# Patient Record
Sex: MALE | Race: OTHER | HISPANIC OR LATINO | URBAN - METROPOLITAN AREA
[De-identification: names, ages, dates, MRNs, and addresses within clinical notes are randomized per-mention and may not be internally consistent; named-entity substitution may affect disease eponyms.]

---

## 2024-04-02 ENCOUNTER — EMERGENCY (EMERGENCY)
Facility: HOSPITAL | Age: 34
LOS: 1 days | Discharge: HOME | End: 2024-04-02
Attending: EMERGENCY MEDICINE
Payer: COMMERCIAL

## 2024-04-02 VITALS
TEMPERATURE: 98 F | HEIGHT: 65 IN | RESPIRATION RATE: 16 BRPM | SYSTOLIC BLOOD PRESSURE: 125 MMHG | DIASTOLIC BLOOD PRESSURE: 79 MMHG | WEIGHT: 179.9 LBS | OXYGEN SATURATION: 99 % | HEART RATE: 75 BPM

## 2024-04-02 VITALS
TEMPERATURE: 98 F | HEART RATE: 70 BPM | RESPIRATION RATE: 18 BRPM | SYSTOLIC BLOOD PRESSURE: 116 MMHG | OXYGEN SATURATION: 99 % | DIASTOLIC BLOOD PRESSURE: 80 MMHG

## 2024-04-02 LAB
HCT VFR BLD CALC: 44.8 % — SIGNIFICANT CHANGE UP (ref 39–50)
HGB BLD-MCNC: 15.5 G/DL — SIGNIFICANT CHANGE UP (ref 13–17)
MCHC RBC-ENTMCNC: 29.9 PG — SIGNIFICANT CHANGE UP (ref 27–34)
MCHC RBC-ENTMCNC: 34.6 GM/DL — SIGNIFICANT CHANGE UP (ref 32–36)
MCV RBC AUTO: 86.3 FL — SIGNIFICANT CHANGE UP (ref 80–100)
PLATELET # BLD AUTO: 201 K/UL — SIGNIFICANT CHANGE UP (ref 150–400)
RBC # BLD: 5.19 M/UL — SIGNIFICANT CHANGE UP (ref 4.2–5.8)
RBC # FLD: 13.1 % — SIGNIFICANT CHANGE UP (ref 10.3–14.5)
WBC # BLD: 7.89 K/UL — SIGNIFICANT CHANGE UP (ref 3.8–10.5)
WBC # FLD AUTO: 7.89 K/UL — SIGNIFICANT CHANGE UP (ref 3.8–10.5)

## 2024-04-02 PROCEDURE — 73610 X-RAY EXAM OF ANKLE: CPT

## 2024-04-02 PROCEDURE — T1013: CPT

## 2024-04-02 PROCEDURE — 73630 X-RAY EXAM OF FOOT: CPT | Mod: 26,50

## 2024-04-02 PROCEDURE — 99285 EMERGENCY DEPT VISIT HI MDM: CPT

## 2024-04-02 PROCEDURE — 96374 THER/PROPH/DIAG INJ IV PUSH: CPT | Mod: XU

## 2024-04-02 PROCEDURE — 36415 COLL VENOUS BLD VENIPUNCTURE: CPT

## 2024-04-02 PROCEDURE — 96375 TX/PRO/DX INJ NEW DRUG ADDON: CPT | Mod: XU

## 2024-04-02 PROCEDURE — 73700 CT LOWER EXTREMITY W/O DYE: CPT | Mod: 26,LT,MC

## 2024-04-02 PROCEDURE — 85027 COMPLETE CBC AUTOMATED: CPT

## 2024-04-02 PROCEDURE — 73610 X-RAY EXAM OF ANKLE: CPT | Mod: 26,50

## 2024-04-02 PROCEDURE — 73630 X-RAY EXAM OF FOOT: CPT

## 2024-04-02 PROCEDURE — 99284 EMERGENCY DEPT VISIT MOD MDM: CPT | Mod: 25

## 2024-04-02 PROCEDURE — 27780 TREATMENT OF FIBULA FRACTURE: CPT

## 2024-04-02 PROCEDURE — 73700 CT LOWER EXTREMITY W/O DYE: CPT | Mod: MC

## 2024-04-02 PROCEDURE — 73590 X-RAY EXAM OF LOWER LEG: CPT | Mod: 26,50

## 2024-04-02 PROCEDURE — 99283 EMERGENCY DEPT VISIT LOW MDM: CPT

## 2024-04-02 PROCEDURE — 73590 X-RAY EXAM OF LOWER LEG: CPT

## 2024-04-02 RX ORDER — OXYCODONE HYDROCHLORIDE 5 MG/1
1 TABLET ORAL
Qty: 15 | Refills: 0
Start: 2024-04-02 | End: 2024-04-06

## 2024-04-02 RX ORDER — KETOROLAC TROMETHAMINE 30 MG/ML
30 SYRINGE (ML) INJECTION ONCE
Refills: 0 | Status: DISCONTINUED | OUTPATIENT
Start: 2024-04-02 | End: 2024-04-02

## 2024-04-02 RX ORDER — ACETAMINOPHEN 500 MG
3 TABLET ORAL
Qty: 63 | Refills: 0
Start: 2024-04-02 | End: 2024-04-08

## 2024-04-02 RX ORDER — MORPHINE SULFATE 50 MG/1
4 CAPSULE, EXTENDED RELEASE ORAL ONCE
Refills: 0 | Status: DISCONTINUED | OUTPATIENT
Start: 2024-04-02 | End: 2024-04-02

## 2024-04-02 RX ORDER — ACETAMINOPHEN 500 MG
1 TABLET ORAL
Qty: 21 | Refills: 0
Start: 2024-04-02 | End: 2024-04-08

## 2024-04-02 RX ADMIN — Medication 30 MILLIGRAM(S): at 13:24

## 2024-04-02 RX ADMIN — MORPHINE SULFATE 4 MILLIGRAM(S): 50 CAPSULE, EXTENDED RELEASE ORAL at 11:11

## 2024-04-02 NOTE — ED PROVIDER NOTE - ATTENDING APP SHARED VISIT CONTRIBUTION OF CARE
indep eval  s/p 600 pound steel girder type piece of steel weighing approx 600 lb slammed into L and R le  pain max L ankle  pe + distress + distal fib /ankle swelling and ttp dorsalis pedis 2 + symm  agree w iv pain meds iv imaging sig comminuted distal 1/3 fib fx comminutes  ortho consult in progress - agree w consult

## 2024-04-02 NOTE — ED PROVIDER NOTE - OBJECTIVE STATEMENT
35 yo male presented to [facility] ED by ambulance after having a 600lbs metal anita falls on his left side LE near the ankle. Patient reports significant 10/10 pain, has not tried ambulating since the incident. He hasn't received any pain meds prior to ED visit. He denies any history of surgeries or accidents before.     In ED, patient received 4mg IVP morphine.  Patient denies any medical history at this time.    OBJECTIVE:  Physical Exam:  Gen: uncomfortable lying in bed  Abd: Soft  Ext: Exquisitely tender at left ankle. Lateral left ankle and medial right ankle have bruise marks and skin changes.  Pedal pulses intact bilateral. No popliteal tenderness or tenderness at the knees.

## 2024-04-02 NOTE — ED PROVIDER NOTE - PATIENT PORTAL LINK FT
You can access the FollowMyHealth Patient Portal offered by [facility] by registering at the following website: [email or web address]. By joining [facility]’s FollowMyHealth portal, you will also be able to view your health information using other applications (apps) compatible with our system.

## 2024-04-02 NOTE — ED PROVIDER NOTE - CLINICAL SUMMARY MEDICAL DECISION MAKING FREE TEXT BOX
35 yo male presented to [facility] ED by ambulance after having a 600lbs metal anita falls on his left side LE near the ankle, XR revealed evidence of distal left fibular fracture. Ortho consulted and recommended splint on LLE. CT performed per ortho recommendations with no further intervention. Patient to f/u within 1 week with Dr. Villalta.

## 2024-04-02 NOTE — PHYSICAL THERAPY INITIAL EVALUATION ADULT - ADDITIONAL COMMENTS
Pt lives with family in a1 story house with 2 steps to enter. Independent with all PTA, without devices.

## 2024-04-02 NOTE — CONSULT NOTE ADULT - SUBJECTIVE AND OBJECTIVE BOX
Pt Name: EDISON QUINCHOREYES  MRN: [id number]    Patient is a 34y Male presenting to the emergency department with a chief complaint left lower extremity pain after a large piece of metal landed on both his leg. 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 Patient admits to swelling and pain with movement of the ankle. Denies numbness or tingling. Patient also has right lower extremity pain but states the left side is significantly worse. Patient is Telugu speaking - ED  was used for the visit.     REVIEW OF SYSTEMS  General: Alert, responsive, in NAD  Musculoskeletal: SEE HPI.  Neurological: No sensory or motor changes.     PAST MEDICAL & SURGICAL HISTORY:  Allergies: No Known Allergies    Medications: ketorolac   Injectable 30 milliGRAM(s) IV Push Once    FAMILY HISTORY: non-contributory    Ambulation: Walking independently                          15.5   7.89  )-----------( 201      ( 02 Apr 2024 10:30 )             44.8     Vital Signs Last 24 Hrs  T(C): 36.6 (02 Apr 2024 10:00), Max: 36.6 (02 Apr 2024 10:00)  T(F): 97.8 (02 Apr 2024 10:00), Max: 97.8 (02 Apr 2024 10:00)  HR: 75 (02 Apr 2024 10:00) (75 - 75)  BP: 125/79 (02 Apr 2024 10:00) (125/79 - 125/79)  BP(mean): --  RR: 16 (02 Apr 2024 10:00) (16 - 16)  SpO2: 99% (02 Apr 2024 10:00) (99% - 99%)  Parameters below as of 02 Apr 2024 10:00  Patient On (Oxygen Delivery Method): room air  Daily Height in cm: 165.1 (02 Apr 2024 10:00)      PHYSICAL EXAM:  Appearance: Alert, responsive, in no acute distress.  Neurological: Sensation is grossly intact to light touch to the bilateral lower extremities  Vascular: DP pulse of the LLE intact. Cap refill < 2 sec. No cyanosis.  Musculoskeletal:  Left Lower Extremity: TTP over the lateral and medial mal with swelling around the ankle and lower shin. +EHL/FHL. Dorsiflexion and plantarflexion intact but limited secondary to pain. Compartments soft and compressible.     Imaging Studies: Official reads pending - LLE films show fracture of the distal 3rd fibula. RLE films reveal no obvious fractures or bony deformities.     SPLINTING   PROCEDURE NOTE: Splinting  Performed by: Amy Peguero PA-C   Indication: Left fibula fracture   The left lower extremity  was appropriately positioned. A plaster splint was applied. Distally, the extremity was neurovascular intact following the procedure. The patient tolerated the procedure well.    A/P:  Pt is a  34y Male with found to have a left distal fibula fracture   PLAN:   * CT ordered - follow up results   * Patient educated on splint care - knows to not get the splint wet and knows to not bare weight on the splint   * PT should be consulted for crutch training   * NWB on the LLE  * Patient educated on elevation   * Follow up outpatient with Dr. Villalta within 1 week     Case discussed with Dr. Villalta who reviewed imaging and gave plan  Pt Name: EDISON QUINCHOREYES  MRN: [id number]    Patient is a 34y Male presenting to the emergency department with a chief complaint left lower extremity pain after a large piece of metal landed on both his leg. 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 Patient admits to swelling and pain with movement of the ankle. Denies numbness or tingling. Patient also has right lower extremity pain but states the left side is significantly worse. Patient is Lithuanian speaking - ED  was used for the visit.     REVIEW OF SYSTEMS  General: Alert, responsive, in NAD  Musculoskeletal: SEE HPI.  Neurological: No sensory or motor changes.     PAST MEDICAL & SURGICAL HISTORY:  Allergies: No Known Allergies    Medications: ketorolac   Injectable 30 milliGRAM(s) IV Push Once    FAMILY HISTORY: non-contributory    Ambulation: Walking independently                          15.5   7.89  )-----------( 201      ( 02 Apr 2024 10:30 )             44.8     Vital Signs Last 24 Hrs  T(C): 36.6 (02 Apr 2024 10:00), Max: 36.6 (02 Apr 2024 10:00)  T(F): 97.8 (02 Apr 2024 10:00), Max: 97.8 (02 Apr 2024 10:00)  HR: 75 (02 Apr 2024 10:00) (75 - 75)  BP: 125/79 (02 Apr 2024 10:00) (125/79 - 125/79)  BP(mean): --  RR: 16 (02 Apr 2024 10:00) (16 - 16)  SpO2: 99% (02 Apr 2024 10:00) (99% - 99%)  Parameters below as of 02 Apr 2024 10:00  Patient On (Oxygen Delivery Method): room air  Daily Height in cm: 165.1 (02 Apr 2024 10:00)      PHYSICAL EXAM:  Appearance: Alert, responsive, in no acute distress.  Neurological: Sensation is grossly intact to light touch to the bilateral lower extremities  Vascular: DP pulse of the LLE intact. Cap refill < 2 sec. No cyanosis.  Musculoskeletal:  Left Lower Extremity: TTP over the lateral and medial mal with swelling around the ankle and lower shin. +EHL/FHL. Dorsiflexion and plantarflexion intact but limited secondary to pain. Compartments soft and compressible.     Imaging Studies: Official reads pending - LLE films show fracture of the distal 3rd fibula. RLE films reveal no obvious fractures or bony deformities.     SPLINTING   PROCEDURE NOTE: Splinting  Performed by: Amy Peguero PA-C   Indication: Left fibula fracture   The left lower extremity  was appropriately positioned. A plaster splint was applied. Distally, the extremity was neurovascular intact following the procedure. The patient tolerated the procedure well.    A/P:  Pt is a  34y Male with found to have a left distal fibula fracture s/p closed treatment without manipulation with splint immobilization  PLAN:   * CT ordered - follow up results   * Patient educated on splint care - knows to not get the splint wet and knows to not bare weight on the splint   * PT should be consulted for crutch training   * NWB on the LLE  * Patient educated on elevation   * Follow up outpatient with Dr. Villalta within 1 week     Case discussed with Dr. Villalta who reviewed imaging and gave plan

## 2024-04-02 NOTE — CONSULT NOTE ADULT - NS ATTEND AMEND GEN_ALL_CORE FT
Agree with PA note and plan as written.  Continue splint immobilization and closed treatment of fibula shaft and distal fibula fracture.  Continue ice and elevation and follow up as outpatient for definitive care - ortho to follow closely, please call with any issues.

## 2024-04-02 NOTE — ED ADULT NURSE NOTE - OBJECTIVE STATEMENT
PAtient presents to ER C/O klaus ankle pain, reports work injury this AM, states metal columns fell onto his ankles, swelling noted bilaterally, reports pain 10/10, no PMH, resp even/unlabored.

## 2024-04-02 NOTE — DISCHARGE NOTE PROVIDER - NSDCFUADDINST_GEN_ALL_CORE_FT
Ortho Recs: Patient is NWB of the left lower extremity - patient to use crutches for ambulation. Patient should follow up with Dr. Villalta within 1 week. Please keep splint clean and dry. Do not get splint wet. The patient is recommended to elevated the affected extremity to reduce swelling. If the splint becomes too tight, the patient is to immediately elevate and contact the office to discuss further management or immediately proceed to the office if unable to make contact with the office.

## 2024-04-02 NOTE — ED ADULT TRIAGE NOTE - CHIEF COMPLAINT QUOTE
Pt LISETH from work after 2 iron columns that weight approx 600lbs a piece fell over onto his shins and ankles.

## 2024-04-02 NOTE — ED PROVIDER NOTE - CARE PROVIDER_API CALL
Martin Villalta  Orthopaedic Surgery  [address] [place] [address]  Phone: [phone]  Fax: [phone]  Follow Up Time: Urgent

## 2024-04-02 NOTE — DISCHARGE NOTE PROVIDER - CARE PROVIDER_API CALL
Martin Villalta  Orthopaedic Surgery  [address] [place] [address]  Phone: [phone]  Fax: [phone]  Follow Up Time:

## 2024-04-02 NOTE — ED PROVIDER NOTE - NSFOLLOWUPINSTRUCTIONS_ED_ALL_ED_FT
Oxycodone 5mg (1 pill) every 8 hours as needed for severe pain  Tylenol 975mg (3 tablets) every 8 hours  Use crutches to ambulate with no weight bearing on left leg  Call Dr. Villalta/[facility] office for appointment within 1 week    Oxicodona 5 mg (1 pastilla) cada 8 horas según sea necesario para el dolor intenso  Tylenol 975 mg (3 comprimidos) cada 8 horas  Utilice muletas para deambular sin apoyar peso en la benyna greg.  Llame a la oficina del Dr. Villalta/[facility] para programar rg kajal dentro de 1 semana

## 2024-04-15 ENCOUNTER — OUTPATIENT (OUTPATIENT)
Dept: OUTPATIENT SERVICES | Facility: HOSPITAL | Age: 34
LOS: 1 days | End: 2024-04-15
Payer: COMMERCIAL

## 2024-04-15 VITALS
SYSTOLIC BLOOD PRESSURE: 100 MMHG | HEART RATE: 93 BPM | RESPIRATION RATE: 18 BRPM | DIASTOLIC BLOOD PRESSURE: 60 MMHG | WEIGHT: 180.34 LBS | HEIGHT: 68 IN | OXYGEN SATURATION: 98 % | TEMPERATURE: 98 F

## 2024-04-15 DIAGNOSIS — Z01.818 ENCOUNTER FOR OTHER PREPROCEDURAL EXAMINATION: ICD-10-CM

## 2024-04-15 DIAGNOSIS — S82.892A OTHER FRACTURE OF LEFT LOWER LEG, INITIAL ENCOUNTER FOR CLOSED FRACTURE: ICD-10-CM

## 2024-04-15 DIAGNOSIS — S82.409A UNSPECIFIED FRACTURE OF SHAFT OF UNSPECIFIED FIBULA, INITIAL ENCOUNTER FOR CLOSED FRACTURE: ICD-10-CM

## 2024-04-15 DIAGNOSIS — Z29.9 ENCOUNTER FOR PROPHYLACTIC MEASURES, UNSPECIFIED: ICD-10-CM

## 2024-04-15 LAB
A1C WITH ESTIMATED AVERAGE GLUCOSE RESULT: 5 % — SIGNIFICANT CHANGE UP (ref 4–5.6)
ANION GAP SERPL CALC-SCNC: 12 MMOL/L — SIGNIFICANT CHANGE UP (ref 5–17)
APTT BLD: 32.4 SEC — SIGNIFICANT CHANGE UP (ref 24.5–35.6)
BASOPHILS # BLD AUTO: 0.03 K/UL — SIGNIFICANT CHANGE UP (ref 0–0.2)
BASOPHILS NFR BLD AUTO: 0.4 % — SIGNIFICANT CHANGE UP (ref 0–2)
BLD GP AB SCN SERPL QL: SIGNIFICANT CHANGE UP
BUN SERPL-MCNC: 17 MG/DL — SIGNIFICANT CHANGE UP (ref 8–20)
CALCIUM SERPL-MCNC: 9.2 MG/DL — SIGNIFICANT CHANGE UP (ref 8.4–10.5)
CHLORIDE SERPL-SCNC: 103 MMOL/L — SIGNIFICANT CHANGE UP (ref 96–108)
CO2 SERPL-SCNC: 26 MMOL/L — SIGNIFICANT CHANGE UP (ref 22–29)
CREAT SERPL-MCNC: 0.88 MG/DL — SIGNIFICANT CHANGE UP (ref 0.5–1.3)
EGFR: 117 ML/MIN/1.73M2 — SIGNIFICANT CHANGE UP
EOSINOPHIL # BLD AUTO: 0.11 K/UL — SIGNIFICANT CHANGE UP (ref 0–0.5)
EOSINOPHIL NFR BLD AUTO: 1.3 % — SIGNIFICANT CHANGE UP (ref 0–6)
ESTIMATED AVERAGE GLUCOSE: 97 MG/DL — SIGNIFICANT CHANGE UP (ref 68–114)
GLUCOSE SERPL-MCNC: 97 MG/DL — SIGNIFICANT CHANGE UP (ref 70–99)
HCT VFR BLD CALC: 39.9 % — SIGNIFICANT CHANGE UP (ref 39–50)
HGB BLD-MCNC: 13.2 G/DL — SIGNIFICANT CHANGE UP (ref 13–17)
IMM GRANULOCYTES NFR BLD AUTO: 0.5 % — SIGNIFICANT CHANGE UP (ref 0–0.9)
INR BLD: 1.01 RATIO — SIGNIFICANT CHANGE UP (ref 0.85–1.18)
LYMPHOCYTES # BLD AUTO: 2.25 K/UL — SIGNIFICANT CHANGE UP (ref 1–3.3)
LYMPHOCYTES # BLD AUTO: 27 % — SIGNIFICANT CHANGE UP (ref 13–44)
MCHC RBC-ENTMCNC: 29.1 PG — SIGNIFICANT CHANGE UP (ref 27–34)
MCHC RBC-ENTMCNC: 33.1 GM/DL — SIGNIFICANT CHANGE UP (ref 32–36)
MCV RBC AUTO: 88.1 FL — SIGNIFICANT CHANGE UP (ref 80–100)
MONOCYTES # BLD AUTO: 0.46 K/UL — SIGNIFICANT CHANGE UP (ref 0–0.9)
MONOCYTES NFR BLD AUTO: 5.5 % — SIGNIFICANT CHANGE UP (ref 2–14)
NEUTROPHILS # BLD AUTO: 5.44 K/UL — SIGNIFICANT CHANGE UP (ref 1.8–7.4)
NEUTROPHILS NFR BLD AUTO: 65.3 % — SIGNIFICANT CHANGE UP (ref 43–77)
PLATELET # BLD AUTO: 309 K/UL — SIGNIFICANT CHANGE UP (ref 150–400)
POTASSIUM SERPL-MCNC: 4.5 MMOL/L — SIGNIFICANT CHANGE UP (ref 3.5–5.3)
POTASSIUM SERPL-SCNC: 4.5 MMOL/L — SIGNIFICANT CHANGE UP (ref 3.5–5.3)
PROTHROM AB SERPL-ACNC: 11.2 SEC — SIGNIFICANT CHANGE UP (ref 9.5–13)
RBC # BLD: 4.53 M/UL — SIGNIFICANT CHANGE UP (ref 4.2–5.8)
RBC # FLD: 13.2 % — SIGNIFICANT CHANGE UP (ref 10.3–14.5)
SODIUM SERPL-SCNC: 141 MMOL/L — SIGNIFICANT CHANGE UP (ref 135–145)
WBC # BLD: 8.33 K/UL — SIGNIFICANT CHANGE UP (ref 3.8–10.5)
WBC # FLD AUTO: 8.33 K/UL — SIGNIFICANT CHANGE UP (ref 3.8–10.5)

## 2024-04-15 PROCEDURE — G0463: CPT

## 2024-04-15 NOTE — H&P PST ADULT - LAST ECHOCARDIOGRAM
[facility] Progress Note


Progress Note


DATE OF SERVICE: 3/9/17





HISTORY: Patient is 34-year-old male with history of ADHD, depression and 

benzodiazepine abuse, notes he had been prescribed Klonopin X 1 month, denies 

abusing medications which is contrary to ER intake report. Writer met with 

patient today to assess treatment progress on the inpatient unit. Patient 

states he has been making more effort to be visible on unit and attend groups, 

though reiterates today, "I don't like groups and closed up spaces, it stems 

from me being in [facility]." Patient reports reduced anxiety and depression, denies 

suicidal and homicidal ideation, denies audiovisual hallucinations, denies urge 

to engage in self-injurious behavior. Patient is on Librium taper and denies 

symptoms of craving or withdrawal. Patient engages better today for interaction

, is less superficially bright, less superficially compliant, expresses 

somewhat less minimization of symptoms and history of substance abuse.  Patient 

remains in agreement with benzodiazepine taper, adds medication regimen remains 

effective and denies medication side effects. Patient denies challenges with 

sleep, concentration, or energy level, states appetite is improving. Patient 

remains fixated on discharge, indicates today he is considering participating 

in substance abuse treatment, remains interested in resuming treatment with 

previous outpatient provider upon discharge from hospital. Patient exhibits no 

signs of agitation or anger, denies symptoms physical pain, and presents with 

no indication of acute distress at time of interaction.





VITAL SIGNS: See below.





NEW TEST RESULTS: No new results


MEDICAL HISTORY: Cleft palate repair, pneumothorax/chest tube


LABS AT ADMISSION:  His CBC showed a hemoglobin of 13.7, hematocrit 40.  Rest


within normal limits.  CMP is unremarkable except AST of 59.  Urine drug screen


is positive for benzodiazepine, cocaine and cannabis.  Blood alcohol level of


0.013.


EKG 3/6/17 sinus rhythm T-wave abnormality early repolarization pericarditis 

other no prior





CURRENT MEDICATIONS: See below.





MENTAL STATUS EXAMINATION ON DISCHARGE: 


Patient is a 34 -year-old male who is pleasant and somewhat less superficially 

cooperative, appears somewhat less disheveled, makes fair eye contact, 

ambulates with steady gait, appears stated age 


Speech is less pressured, generally normal rhythm and volume, coherent.


Language skills are intact.


Thought processes including: Clear, goal-directed, fixated on discharge


Thought content: Rational, logical.


Abstract reasoning, and computation: Requires further evaluation.


Description of associations: Appear intact.


Description of abnormal or psychotic thoughts: denies hallucinations, delusions

, preoccupation with violence, homicidal or suicidal ideation, and obsessions].


Judgment: Poor, verbalizes some improvement.


Insight: Poor, expresses some improvement


Orientation to time, place and person.


Recent and remote memory: Immediate, short-term and long-term memory appear 

intact.


Attention span and concentration: Limited, some improvement.


Language: Normal.


Fund of knowledge: Adequate.


Mood: "I feel fine." Patient appears less depressed and anxious, no mood 

lability noted, is superficially compliant, demise his symptoms


Affect: Constricted, generally congruent with mood 





DIAGNOSES: Unspecified depressive disorder, Attention deficit hyperactivity 

disorder by history, polysubstance use disorder, rule out substance-induced 

mood disorder 


 


ASSESSMENT: Patient continues to adjust to unit, is observed to be somewhat 

more visible on unit, isolates to room less, has been making increased effort 

to attend groups. Patient denies suicidal and homicidal thinking and is able to 

verbalize how to access supportive services on the unit if needed. Patient is 

less superficially compliant and is minimizing less his substance use/abuse, 

seems to be beginning to understand the gravity of events which led to his 

current hospitalization. Will continue to monitor patient's response to 

medications and benzo taper, monitor for side effects, and evaluate resolution 

of suicidal thinking and agitation, and discharge readiness. Patient indicates 

when ready he plans to discharge to home with girlfriend and participate in 

outpatient psychiatric services, has been encouraged to consider inpatient 

substance abuse treatment, patient is declining at this time but agrees to 

consider.





MANAGEMENT PLAN: Continue Librium taper: Librium to 5 mg po BID to end tonight 

after 2100 dose, thereafter 5 mg po hs X 1 on Friday then stop. Continue 

mirtazapine 45 mg po q hs, gabapentin 800 mg po TID, and Prozac 40 mg po q am 


Maintain safety precautions


Patient to attend groups and participate in unit programming to develop coping 

strategies


Engage patient in discharge planning process and arrange meeting with patient's 

support system to ensure safe discharge planning when appropriate


Patient to follow up with PCM upon discharge





TIME SPENT: 25  minutes.





Vital Signs





 Vital Signs








  Date Time  Temp Pulse Resp B/P Pulse Ox O2 Delivery O2 Flow Rate FiO2


 


3/9/17 06:32 95.4 66 16 106/69    


 


3/7/17 09:00      Room Air  


 


3/5/17 18:15     99   











Current Medications





 Current Medications


Acetaminophen (Tylenol Tab) 650 mg Q6HP  PRN PO HEADACHE or DISCOMFORT;  Start 3

/5/17 at 18:00;  Stop 4/4/17 at 17:59


Al Hydrox/Mg Hydrox/Simethicone (Mylanta) 30 ml Q4HP  PRN PO HEARTBURN/

INDIGESTION;  Start 3/5/17 at 18:00;  Stop 4/4/17 at 17:59


Chlordiazepoxide (Librium) 5 mg BID PO  Last administered on 3/9/17at 08:58;  

Start 3/8/17 at 09:00;  Stop 3/9/17 at 14:34;  Status DC


Chlordiazepoxide (Librium) 5 mg BID PO ;  Start 3/9/17 at 22:00;  Stop 3/16/17 

at 21:59;  Status UNV


Chlordiazepoxide (Librium) 5 mg QHS PO ;  Start 3/10/17 at 21:00;  Stop 3/10/17 

at 22:00;  Status UNV


Chlordiazepoxide (Librium) 10 mg BID PO  Last administered on 3/7/17at 08:45;  

Start 3/6/17 at 09:00;  Stop 3/7/17 at 18:57;  Status DC


Chlordiazepoxide (Librium) 10 mg QHS PO ;  Start 3/8/17 at 21:00;  Stop 3/8/17 

at 21:00;  Status DC


Chlordiazepoxide (Librium) 25 mg TIDP  PRN PO SEE LABEL COMMENTS Last 

administered on 3/5/17at 21:44;  Start 3/5/17 at 18:00;  Stop 3/12/17 at 17:59


Fluoxetine HCl (PROzac) 40 mg DAILY PO  Last administered on 3/9/17at 08:58;  

Start 3/5/17 at 09:00;  Stop 4/4/17 at 08:59


Fluoxetine HCl (PROzac) 40 mg DAILY PO ;  Start 3/6/17 at 09:00;  Stop 3/6/17 

at 09:00;  Status DC


Gabapentin (Neurontin) 800 mg TID PO ;  Start 3/5/17 at 21:00;  Stop 3/5/17 at 

21:00;  Status DC


Gabapentin (Neurontin) 800 mg TID PO  Last administered on 3/9/17at 08:58;  

Start 3/5/17 at 21:00;  Stop 4/4/17 at 20:59


Home Med (Med Rec Complete!)  ASDIRECTED XX ;  Start 3/5/17 at 18:15;  Stop 3/5/

17 at 18:28;  Status DC


Home Med (Med Rec Complete!)  ASDIRECTED XX ;  Start 3/5/17 at 18:30;  Stop 3/5/

17 at 18:39;  Status DC


Magnesium Hydroxide (Milk Of Magnesia) 30 ml DAILYPRN  PRN PO CONSTIPATION;  

Start 3/5/17 at 18:00;  Stop 4/4/17 at 17:59


Mirtazapine (Remeron) 45 mg QHS PO  Last administered on 3/8/17at 20:39;  Start 

3/6/17 at 21:00;  Stop 4/5/17 at 20:59


Mupirocin (Bactroban 2% Ointment) 1 dose BID TOP  Last administered on 3/6/17at 

11:24;  Start 3/6/17 at 09:00;  Stop 4/5/17 at 08:59


Nicotine (Nicoderm Cq 21mg) 1 patch DAILY TD  Last administered on 3/9/17at 08:

58;  Start 3/5/17 at 09:00;  Stop 4/4/17 at 08:59


Trazodone HCl (Desyrel) 100 mg QHSP  PRN PO INSOMNIA Last administered on 3/5/

17at 20:23;  Start 3/5/17 at 18:00;  Stop 3/7/17 at 19:14;  Status DC





Allergies


Coded Allergies:  


     No Known Allergies (Unverified , 9/24/16)








Isabella Godoy Mar 9, 2017 14:41 none

## 2024-04-15 NOTE — H&P PST ADULT - NSICDXPASTMEDICALHX_GEN_ALL_CORE_FT
PAST MEDICAL HISTORY:  Left ankle pain      PAST MEDICAL HISTORY:  Left ankle pain     Left fibular fracture     Left malleolar fracture

## 2024-04-15 NOTE — H&P PST ADULT - GASTROINTESTINAL
normal/soft/nontender/nondistended/normal active bowel sounds negative normal/soft/nontender/nondistended/normal active bowel sounds/no guarding/no rigidity/no organomegaly/no palpable bev

## 2024-04-15 NOTE — H&P PST ADULT - NS ATTEND AMEND GEN_ALL_CORE FT
Agree with PA note and plan as written - patient medically optimized and at this time advised of risk, benefits, alternatives, and complications of procedure and states he understands all that is involved.  Informed consent obtained. Await OR.

## 2024-04-15 NOTE — H&P PST ADULT - HISTORY OF PRESENT ILLNESS
Pt is a 34 years old male seen today pre-op for Open Reduction internal fixation left fibular, syndesmosis athrotomy  Pt is a 34 years old male seen today pre-op for Open Reduction internal fixation left fibular, syndesmosis athrotomy form fracture left fibular and mallelolus. Pt states traumatic injury at work. Pt states the metel mechine (Like a craine ) April 2nd 2024. Pt rate pain as 5/10, aching to sharp pain, use crutches for ambulation  Pt is a 34 years old male, Upper sorbian speaking, seen today pre-op for Open Reduction internal fixation left fibular, syndesmosis arthrotomy for  fracture left fibular and malleolus. Pt states traumatic injury at work April 2nd 2024. Pt rate pain as 5/10, aching to sharp pain, worsened with mobility, relief with rest and prn pain medication. Pt  use crutches for ambulation for non weight bearing left foot. Scheduled fro this surgery on 4/17/24 with Gaston Austin

## 2024-04-15 NOTE — H&P PST ADULT - MS GEN HX ROS MEA POS PC
joint swelling/joint pain/muscle weakness left foot/joint swelling/joint pain/muscle weakness left ankle fracture/joint swelling/joint pain/muscle weakness/stiffness

## 2024-04-15 NOTE — H&P PST ADULT - MUSCULOSKELETAL
decreased ROM/decreased ROM due to pain/strength 5/5 bilateral lower extremities/decreased strength details… left foot/decreased ROM/decreased ROM due to pain/joint swelling/strength 5/5 bilateral lower extremities/decreased strength/abnormal gait

## 2024-04-15 NOTE — H&P PST ADULT - ASSESSMENT
Pt is a 34 years old male, Thai speaking, seen today pre-op for Open Reduction internal fixation left fibular, syndesmosis arthrotomy for  fracture left fibular and malleolus. Pt states traumatic injury at work 2024. Pt rate pain as 5/10, aching to sharp pain, worsened with mobility, relief with rest and prn pain medication. Pt  use crutches for ambulation for non weight bearing left foot. Scheduled fro this surgery on 24 with Gaston Austin. Surgery protocol reviewed with pt today. Pt States he was started  on  mg PO q daily since 24 and notified to stop this one day prior to this surgery by his surgeon's office. Last dose of Aspirin today 4/15/24    CAPRINI VTE 2.0 SCORE [CLOT updated 2019]    AGE RELATED RISK FACTORS                                                       MOBILITY RELATED FACTORS  [ ] Age 41-60 years                                            (1 Point)                    [ ] Bed rest                                                        (1 Point)  [ ] Age: 61-74 years                                           (2 Points)                  [ ] Plaster cast                                                   (2 Points)  [ ] Age= 75 years                                              (3 Points)                    [ ] Bed bound for more than 72 hours                 (2 Points)    DISEASE RELATED RISK FACTORS                                               GENDER SPECIFIC FACTORS  [ ] Edema in the lower extremities                       (1 Point)              [ ] Pregnancy                                                     (1 Point)  [ ] Varicose veins                                               (1 Point)                     [ ] Post-partum < 6 weeks                                   (1 Point)             [ x] BMI > 25 Kg/m2                                            (1 Point)                     [ ] Hormonal therapy  or oral contraception          (1 Point)                 [ ] Sepsis (in the previous month)                        (1 Point)               [ ] History of pregnancy complications                 (1 point)  [ ] Pneumonia or serious lung disease                                               [ ] Unexplained or recurrent                     (1 Point)           (in the previous month)                               (1 Point)  [ ] Abnormal pulmonary function test                     (1 Point)                 SURGERY RELATED RISK FACTORS  [ ] Acute myocardial infarction                              (1 Point)               [ ]  Section                                             (1 Point)  [ ] Congestive heart failure (in the previous month)  (1 Point)      [ ] Minor surgery                                                  (1 Point)   [ ] Inflammatory bowel disease                             (1 Point)               [ ] Arthroscopic surgery                                        (2 Points)  [ ] Central venous access                                      (2 Points)                [x ] General surgery lasting more than 45 minutes (2 points)  [ ] Malignancy- Present or previous                   (2 Points)                [ ] Elective arthroplasty                                         (5 points)    [ ] Stroke (in the previous month)                          (5 Points)                                                                                                                                                           HEMATOLOGY RELATED FACTORS                                                 TRAUMA RELATED RISK FACTORS  [ ] Prior episodes of VTE                                     (3 Points)                [ ] Fracture of the hip, pelvis, or leg                       (5 Points)  [ ] Positive family history for VTE                         (3 Points)             [ ] Acute spinal cord injury (in the previous month)  (5 Points)  [ ] Prothrombin 41738 A                                     (3 Points)               [ ] Paralysis  (less than 1 month)                             (5 Points)  [ ] Factor V Leiden                                             (3 Points)                  [ ] Multiple Trauma within 1 month                        (5 Points)  [ ] Lupus anticoagulants                                     (3 Points)                                                           [ ] Anticardiolipin antibodies                               (3 Points)                                                       [ ] High homocysteine in the blood                      (3 Points)                                             [ ] Other congenital or acquired thrombophilia      (3 Points)                                                [ ] Heparin induced thrombocytopenia                  (3 Points)                                     Total Score [       3   ]  OPIOID RISK TOOL    JANINE EACH BOX THAT APPLIES AND ADD TOTALS AT THE END    FAMILY HISTORY OF SUBSTANCE ABUSE                 FEMALE         MALE                                                Alcohol                             [  ]1 pt          [  ]3pts                                               Illegal Durgs                     [  ]2 pts        [  ]3pts                                               Rx Drugs                           [  ]4 pts        [  ]4 pts    PERSONAL HISTORY OF SUBSTANCE ABUSE                                                                                          Alcohol                             [  ]3 pts       [  ]3 pts                                               Illegal Drugs                     [  ]4 pts        [  ]4 pts                                               Rx Drugs                           [  ]5 pts        [  ]5 pts    AGE BETWEEN 16-45 YEARS                                      [  ]1 pt         [  ]1 pt    HISTORY OF PREADOLESCENT   SEXUAL ABUSE                                                             [  ]3 pts        [  ]0pts    PSYCHOLOGICAL DISEASE                     ADD, OCD, Bipolar, Schizophrenia        [  ]2 pts         [  ]2 pts                      Depression                                               [  ]1 pt           [  ]1 pt           SCORING TOTAL   (add numbers and type here)              (*0**)                                     A score of 3 or lower indicated LOW risk for future opioid abuse  A score of 4 to 7 indicated moderate risk for future opioid abuse  A score of 8 or higher indicates a high risk for opioid abuse Pt is a 34 years old male, Yi speaking, seen today pre-op for Open Reduction internal fixation left fibular, syndesmosis arthrotomy for  fracture left fibular and malleolus. Pt states traumatic injury at work 2024. Pt rate pain as 5/10, aching to sharp pain, worsened with mobility, relief with rest and prn pain medication. Pt  use crutches for ambulation for non weight bearing left foot. Scheduled for this surgery on 24 with Gaston Austin. Surgery protocol reviewed with pt today. Pt States he was started  on  mg PO q daily since 24 and he was  notified to stop  ASA one day prior to this surgery by his surgeon's office. Last dose of Aspirin today 4/15/24 as per Pt     CAPRINI VTE 2.0 SCORE [CLOT updated 2019]    AGE RELATED RISK FACTORS                                                       MOBILITY RELATED FACTORS  [ ] Age 41-60 years                                            (1 Point)                    [ ] Bed rest                                                        (1 Point)  [ ] Age: 61-74 years                                           (2 Points)                  [ ] Plaster cast                                                   (2 Points)  [ ] Age= 75 years                                              (3 Points)                    [ ] Bed bound for more than 72 hours                 (2 Points)    DISEASE RELATED RISK FACTORS                                               GENDER SPECIFIC FACTORS  [ ] Edema in the lower extremities                       (1 Point)              [ ] Pregnancy                                                     (1 Point)  [ ] Varicose veins                                               (1 Point)                     [ ] Post-partum < 6 weeks                                   (1 Point)             [ x] BMI > 25 Kg/m2                                            (1 Point)                     [ ] Hormonal therapy  or oral contraception          (1 Point)                 [ ] Sepsis (in the previous month)                        (1 Point)               [ ] History of pregnancy complications                 (1 point)  [ ] Pneumonia or serious lung disease                                               [ ] Unexplained or recurrent                     (1 Point)           (in the previous month)                               (1 Point)  [ ] Abnormal pulmonary function test                     (1 Point)                 SURGERY RELATED RISK FACTORS  [ ] Acute myocardial infarction                              (1 Point)               [ ]  Section                                             (1 Point)  [ ] Congestive heart failure (in the previous month)  (1 Point)      [ ] Minor surgery                                                  (1 Point)   [ ] Inflammatory bowel disease                             (1 Point)               [ ] Arthroscopic surgery                                        (2 Points)  [ ] Central venous access                                      (2 Points)                [x ] General surgery lasting more than 45 minutes (2 points)  [ ] Malignancy- Present or previous                   (2 Points)                [ ] Elective arthroplasty                                         (5 points)    [ ] Stroke (in the previous month)                          (5 Points)                                                                                                                                                           HEMATOLOGY RELATED FACTORS                                                 TRAUMA RELATED RISK FACTORS  [ ] Prior episodes of VTE                                     (3 Points)                [ ] Fracture of the hip, pelvis, or leg                       (5 Points)  [ ] Positive family history for VTE                         (3 Points)             [ ] Acute spinal cord injury (in the previous month)  (5 Points)  [ ] Prothrombin 57371 A                                     (3 Points)               [ ] Paralysis  (less than 1 month)                             (5 Points)  [ ] Factor V Leiden                                             (3 Points)                  [ ] Multiple Trauma within 1 month                        (5 Points)  [ ] Lupus anticoagulants                                     (3 Points)                                                           [ ] Anticardiolipin antibodies                               (3 Points)                                                       [ ] High homocysteine in the blood                      (3 Points)                                             [ ] Other congenital or acquired thrombophilia      (3 Points)                                                [ ] Heparin induced thrombocytopenia                  (3 Points)                                     Total Score [       3   ]  OPIOID RISK TOOL    JANINE EACH BOX THAT APPLIES AND ADD TOTALS AT THE END    FAMILY HISTORY OF SUBSTANCE ABUSE                 FEMALE         MALE                                                Alcohol                             [  ]1 pt          [  ]3pts                                               Illegal Durgs                     [  ]2 pts        [  ]3pts                                               Rx Drugs                           [  ]4 pts        [  ]4 pts    PERSONAL HISTORY OF SUBSTANCE ABUSE                                                                                          Alcohol                             [  ]3 pts       [  ]3 pts                                               Illegal Drugs                     [  ]4 pts        [  ]4 pts                                               Rx Drugs                           [  ]5 pts        [  ]5 pts    AGE BETWEEN 16-45 YEARS                                      [  ]1 pt         [  ]1 pt    HISTORY OF PREADOLESCENT   SEXUAL ABUSE                                                             [  ]3 pts        [  ]0pts    PSYCHOLOGICAL DISEASE                     ADD, OCD, Bipolar, Schizophrenia        [  ]2 pts         [  ]2 pts                      Depression                                               [  ]1 pt           [  ]1 pt           SCORING TOTAL   (add numbers and type here)              (*0**)                                     A score of 3 or lower indicated LOW risk for future opioid abuse  A score of 4 to 7 indicated moderate risk for future opioid abuse  A score of 8 or higher indicates a high risk for opioid abuse

## 2024-04-16 LAB
MRSA PCR RESULT.: SIGNIFICANT CHANGE UP
S AUREUS DNA NOSE QL NAA+PROBE: DETECTED

## 2024-04-16 RX ORDER — POVIDONE-IODINE 5 %
1 AEROSOL (ML) TOPICAL ONCE
Refills: 0 | Status: COMPLETED | OUTPATIENT
Start: 2024-04-17 | End: 2024-04-17

## 2024-04-17 ENCOUNTER — OUTPATIENT (OUTPATIENT)
Dept: INPATIENT UNIT | Facility: HOSPITAL | Age: 34
LOS: 1 days | End: 2024-04-17
Payer: COMMERCIAL

## 2024-04-17 VITALS
RESPIRATION RATE: 17 BRPM | DIASTOLIC BLOOD PRESSURE: 86 MMHG | TEMPERATURE: 97 F | HEART RATE: 95 BPM | OXYGEN SATURATION: 98 % | SYSTOLIC BLOOD PRESSURE: 123 MMHG

## 2024-04-17 VITALS
TEMPERATURE: 98 F | SYSTOLIC BLOOD PRESSURE: 109 MMHG | RESPIRATION RATE: 16 BRPM | HEART RATE: 82 BPM | DIASTOLIC BLOOD PRESSURE: 67 MMHG | HEIGHT: 68 IN | WEIGHT: 180.34 LBS | OXYGEN SATURATION: 99 %

## 2024-04-17 DIAGNOSIS — S82.402A UNSPECIFIED FRACTURE OF SHAFT OF LEFT FIBULA, INITIAL ENCOUNTER FOR CLOSED FRACTURE: ICD-10-CM

## 2024-04-17 DIAGNOSIS — S82.62XA DISPLACED FRACTURE OF LATERAL MALLEOLUS OF LEFT FIBULA, INITIAL ENCOUNTER FOR CLOSED FRACTURE: ICD-10-CM

## 2024-04-17 LAB — ABO RH CONFIRMATION: SIGNIFICANT CHANGE UP

## 2024-04-17 PROCEDURE — 27792 TREATMENT OF ANKLE FRACTURE: CPT | Mod: LT

## 2024-04-17 PROCEDURE — 27784 TREATMENT OF FIBULA FRACTURE: CPT | Mod: LT

## 2024-04-17 PROCEDURE — C9399: CPT

## 2024-04-17 PROCEDURE — C1713: CPT

## 2024-04-17 DEVICE — SCREW CORTEX S-T 2.7X10MM: Type: IMPLANTABLE DEVICE | Status: FUNCTIONAL

## 2024-04-17 DEVICE — SCREW CORT S-T 3.5X12MM: Type: IMPLANTABLE DEVICE | Status: FUNCTIONAL

## 2024-04-17 DEVICE — SCREW LOKG S-T W/ T8 STARDRIVE RECESS 2.7X16MM: Type: IMPLANTABLE DEVICE | Status: FUNCTIONAL

## 2024-04-17 DEVICE — SCREW LOKG S-T W/ T8 STARDRIVE RECESS 2.7X18MM: Type: IMPLANTABLE DEVICE | Status: FUNCTIONAL

## 2024-04-17 DEVICE — SCREW CORT S-T 3.5X14MM: Type: IMPLANTABLE DEVICE | Status: FUNCTIONAL

## 2024-04-17 DEVICE — IMPLANTABLE DEVICE: Type: IMPLANTABLE DEVICE | Status: FUNCTIONAL

## 2024-04-17 DEVICE — SCREW LOKG S-T W/ T8 STARDRIVE RECESS 2.7X14MM: Type: IMPLANTABLE DEVICE | Status: FUNCTIONAL

## 2024-04-17 RX ORDER — FENTANYL CITRATE 50 UG/ML
25 INJECTION INTRAVENOUS
Refills: 0 | Status: DISCONTINUED | OUTPATIENT
Start: 2024-04-17 | End: 2024-04-17

## 2024-04-17 RX ORDER — PANTOPRAZOLE SODIUM 20 MG/1
1 TABLET, DELAYED RELEASE ORAL
Qty: 42 | Refills: 0
Start: 2024-04-17

## 2024-04-17 RX ORDER — ACETAMINOPHEN 500 MG
975 TABLET ORAL ONCE
Refills: 0 | Status: COMPLETED | OUTPATIENT
Start: 2024-04-17 | End: 2024-04-17

## 2024-04-17 RX ORDER — ONDANSETRON 8 MG/1
4 TABLET, FILM COATED ORAL ONCE
Refills: 0 | Status: DISCONTINUED | OUTPATIENT
Start: 2024-04-17 | End: 2024-05-01

## 2024-04-17 RX ORDER — HYDROMORPHONE HYDROCHLORIDE 2 MG/ML
0.5 INJECTION INTRAMUSCULAR; INTRAVENOUS; SUBCUTANEOUS
Refills: 0 | Status: DISCONTINUED | OUTPATIENT
Start: 2024-04-17 | End: 2024-04-17

## 2024-04-17 RX ORDER — OXYCODONE HYDROCHLORIDE 5 MG/1
10 TABLET ORAL
Refills: 0 | Status: DISCONTINUED | OUTPATIENT
Start: 2024-04-17 | End: 2024-04-17

## 2024-04-17 RX ORDER — NALOXONE HYDROCHLORIDE 4 MG/.1ML
4 SPRAY NASAL
Qty: 1 | Refills: 0
Start: 2024-04-17

## 2024-04-17 RX ORDER — ASPIRIN/CALCIUM CARB/MAGNESIUM 324 MG
1 TABLET ORAL
Refills: 0 | DISCHARGE

## 2024-04-17 RX ORDER — SODIUM CHLORIDE 9 MG/ML
3 INJECTION INTRAMUSCULAR; INTRAVENOUS; SUBCUTANEOUS ONCE
Refills: 0 | Status: DISCONTINUED | OUTPATIENT
Start: 2024-04-17 | End: 2024-04-17

## 2024-04-17 RX ORDER — APREPITANT 80 MG/1
40 CAPSULE ORAL ONCE
Refills: 0 | Status: COMPLETED | OUTPATIENT
Start: 2024-04-17 | End: 2024-04-17

## 2024-04-17 RX ORDER — CEFAZOLIN SODIUM 1 G
2000 VIAL (EA) INJECTION ONCE
Refills: 0 | Status: DISCONTINUED | OUTPATIENT
Start: 2024-04-17 | End: 2024-04-17

## 2024-04-17 RX ORDER — OXYCODONE HYDROCHLORIDE 5 MG/1
1 TABLET ORAL
Qty: 42 | Refills: 0
Start: 2024-04-17 | End: 2024-04-23

## 2024-04-17 RX ORDER — OXYCODONE HYDROCHLORIDE 5 MG/1
5 TABLET ORAL
Refills: 0 | Status: DISCONTINUED | OUTPATIENT
Start: 2024-04-17 | End: 2024-04-17

## 2024-04-17 RX ORDER — ONDANSETRON 8 MG/1
4 TABLET, FILM COATED ORAL ONCE
Refills: 0 | Status: DISCONTINUED | OUTPATIENT
Start: 2024-04-17 | End: 2024-04-17

## 2024-04-17 RX ORDER — SENNA PLUS 8.6 MG/1
2 TABLET ORAL
Qty: 14 | Refills: 0
Start: 2024-04-17 | End: 2024-04-23

## 2024-04-17 RX ORDER — ASPIRIN/CALCIUM CARB/MAGNESIUM 324 MG
1 TABLET ORAL
Qty: 28 | Refills: 0
Start: 2024-04-17 | End: 2024-04-30

## 2024-04-17 RX ORDER — CELECOXIB 200 MG/1
400 CAPSULE ORAL ONCE
Refills: 0 | Status: COMPLETED | OUTPATIENT
Start: 2024-04-17 | End: 2024-04-17

## 2024-04-17 RX ORDER — ACETAMINOPHEN 500 MG
1000 TABLET ORAL ONCE
Refills: 0 | Status: DISCONTINUED | OUTPATIENT
Start: 2024-04-17 | End: 2024-04-17

## 2024-04-17 RX ADMIN — OXYCODONE HYDROCHLORIDE 5 MILLIGRAM(S): 5 TABLET ORAL at 13:30

## 2024-04-17 RX ADMIN — OXYCODONE HYDROCHLORIDE 5 MILLIGRAM(S): 5 TABLET ORAL at 13:01

## 2024-04-17 RX ADMIN — CELECOXIB 400 MILLIGRAM(S): 200 CAPSULE ORAL at 10:04

## 2024-04-17 RX ADMIN — Medication 975 MILLIGRAM(S): at 10:05

## 2024-04-17 RX ADMIN — APREPITANT 40 MILLIGRAM(S): 80 CAPSULE ORAL at 10:05

## 2024-04-17 RX ADMIN — Medication 1 APPLICATION(S): at 09:09

## 2024-04-17 NOTE — ASU DISCHARGE PLAN (ADULT/PEDIATRIC) - NS MD DC FALL RISK RISK
For information on Fall & Injury Prevention, visit: [email or web address] OR  [email or web address] OR  https://www.cdc.gov/steadi/patient.html

## 2024-04-17 NOTE — PHYSICAL THERAPY INITIAL EVALUATION ADULT - ADDITIONAL COMMENTS
Pt lives with family in a 1 story house with 2 steps to enter. Independent with all PTA, without devices.

## 2024-04-17 NOTE — ASU DISCHARGE PLAN (ADULT/PEDIATRIC) - ASU DC SPECIAL INSTRUCTIONSFT
DO NOT REMOVE SPLINT   DO NOT GET SPLINT WET  WRAP SPLINT IN PLASTIC WRAP WHEN SHOWERING  Non weight bearing left lower extremity   Pain medication sent to pharmacy   You will take aspirin twice a day for 2 weeks for blood clot prevention   Call Dr Villalta's office to confirm appointment

## 2024-04-17 NOTE — ASU DISCHARGE PLAN (ADULT/PEDIATRIC) - CARE PROVIDER_API CALL
Martin Villalta  Orthopaedic Surgery  [address] [place] [address]  Phone: [phone]  Fax: [phone]  Follow Up Time:

## 2024-04-17 NOTE — BRIEF OPERATIVE NOTE - NSICDXBRIEFPREOP_GEN_ALL_CORE_FT
PRE-OP DIAGNOSIS:  Ankle fracture, right 17-Apr-2024 12:36:35  Westley Phan   PRE-OP DIAGNOSIS:  Ankle fracture, left 17-Apr-2024 12:44:52  Westley Phan

## 2024-04-17 NOTE — BRIEF OPERATIVE NOTE - NSICDXBRIEFPROCEDURE_GEN_ALL_CORE_FT
PROCEDURES:  Open reduction and internal fixation (ORIF) of dislocation of ankle 17-Apr-2024 12:36:19  Westley Phan

## 2024-04-17 NOTE — BRIEF OPERATIVE NOTE - NSICDXBRIEFPOSTOP_GEN_ALL_CORE_FT
POST-OP DIAGNOSIS:  Ankle fracture, right 17-Apr-2024 12:36:40  Westley Phan   POST-OP DIAGNOSIS:  Ankle fracture, left 17-Apr-2024 12:45:05  Westley Phan
